# Patient Record
Sex: FEMALE | Race: WHITE
[De-identification: names, ages, dates, MRNs, and addresses within clinical notes are randomized per-mention and may not be internally consistent; named-entity substitution may affect disease eponyms.]

---

## 2022-12-07 ENCOUNTER — HOSPITAL ENCOUNTER (OUTPATIENT)
Dept: HOSPITAL 75 - RAD | Age: 42
End: 2022-12-07
Attending: NURSE PRACTITIONER
Payer: OTHER GOVERNMENT

## 2022-12-07 DIAGNOSIS — Z12.31: Primary | ICD-10-CM

## 2022-12-07 PROCEDURE — 77067 SCR MAMMO BI INCL CAD: CPT

## 2022-12-07 PROCEDURE — 77063 BREAST TOMOSYNTHESIS BI: CPT

## 2022-12-07 NOTE — DIAGNOSTIC IMAGING REPORT
INDICATION: Routine screening.



No prior mammograms are available for comparison. This a baseline

study.



2-D and 3-D bilateral screening mammography was performed with

CAD.



Both breasts are heterogeneously dense, limiting the sensitivity

of mammography. No spiculated mass or malignant-appearing

microcalcifications are seen. Axillae are unremarkable.



IMPRESSION:  No mammographic features suspicious for malignancy

are identified.



ACR BI-RADS Category 1: Negative.

Result letter will be mailed to the patient.

Note:  At least 10% of breast cancer is not imaged by

mammography.



BI-RADS Category 1



Dictated by: 



  Dictated on workstation # [id number]